# Patient Record
Sex: MALE | Race: WHITE | NOT HISPANIC OR LATINO | Employment: OTHER | URBAN - METROPOLITAN AREA
[De-identification: names, ages, dates, MRNs, and addresses within clinical notes are randomized per-mention and may not be internally consistent; named-entity substitution may affect disease eponyms.]

---

## 2024-05-08 NOTE — H&P (VIEW-ONLY)
Preoperative History and Physical Examination    Date of Exam: 5/8/2024  Proposed Surgery: Bilateral Cataract  Surgeon: Dr. Festus Reece   Date of Surgery: Right 5/16/24 Left 5/30/24  Location of Surgery:  Cataract surgery-[facility]   Fax number: [phone]    Date of Exam: 5/8/2024  Proposed Surgery: Upper Endoscopy  Surgeon: Hayden Yun  Date of Surgery: 5/28/24  Location of Surgery: Endoscopy [facility]  Fax number: [phone]    Gavin Velasco is an 89 y.o. male who presents for a preoperative clearance history and physical exam at the request of the above mentioned surgeon for the surgery indicated.     Copy of this evaluation and/or correspondence will be faxed to the above [facility]/surgery center and/or surgeon's office.     INDICATION FOR SURGERY: Patient presents for preop evaluation at the request of Dr. Reece for cataract surgery.  He also presents for preop evaluation at the request of Dr. Yun for endoscopy.    HPI: Patient has a past medical history significant for cataracts and does need surgery bilaterally.  Patient has been following with [facility].  He has had weight loss as well and has a positive FOBT and a low hemoglobin which seems to be trending down.  [facility] was consulted.  It was advised by Dr. Grimm to do a follow-up endoscopy.    No known allergies.  Never smoker.  No history of anesthesia complications.    Hypertension-blood pressure well-controlled on lisinopril 10 mg.    Chronic kidney disease stage III - Creatinine last checked 3/14/2024 with creatinine 1.16 and EGFR 59.  Labs pending.    Infrarenal abdominal aortic aneurysm-follows with cardiology.    Imaging last completed 5/8/2023:  Aneurysmal dilatation infrarenal abdominal aorta measuring 4.3 cm   increased in size from previous examination at which time it measured   3.8 cm.    Venous insufficiency/PAD-conservatively managed.  Arterial ultrasound 5/8/2023:  1.  Right lower extremity:  OSMANI at rest is normal at 1.1. Large amount   of arterial calcification. Normal multiphasic waveforms throughout   the right lower extremity arteries without evidence of a   hemodynamically significant lesion.  2.  Left lower extremity: OSMANI at rest is normal at 1.0. Large amount   of arterial calcification. Normal multiphasic waveforms throughout   the left lower extremity arteries without evidence of a   hemodynamically significant lesion.    CBC last checked 3/14/2024 with hemoglobin at 10.6.    Arthritis - follows with rheumatology. On arava.      CURRENT COMORBIDITIES: Hypertension    USE OF ANTITHROMBOTIC: None    Current Outpatient Medications:   Medication Sig     Cholecalciferol, Vitamin D3, 2,000 unit oral tablet 1 tablet (50 mcg).     ferrous sulfate (FERATAB) 325 mg (65 mg iron) oral tablet Take 1 tablet (325 mg) by mouth Every Friday and Monday.     leflunomide (ARAVA) 20 mg oral tablet Take 1 tablet (20 mg) by mouth once daily.     lisinopriL (PRINIVIL) 10 mg oral tablet Take 1 tablet (10 mg) by mouth once daily. If blood pressure > 140/90     Miscellaneous Medical Supply   1 Units by Misc.(Non-Drug; Combo Route) route once daily. Wrist brace     multivitamin oral tablet Daily.     psyllium husk (METAMUCIL) 6 gram oral PwPk Take 1 Teaspoonful by mouth once daily.       No Known Allergies    Past Medical History:   Diagnosis Date     Essential hypertension, benign        Past Surgical History:   Procedure Laterality Date     HX ABDOMINAL SURGERY         Family History   Problem Relation Name Age of Onset     Skin Melanoma Mother       Kidney Disease Father       Diabetes Brother       No Known Problems Maternal Grandmother       No Known Problems Maternal Grandfather       No Known Problems Paternal Grandmother       No Known Problems Paternal Grandfather       Stroke Sister         Social History     Tobacco Use     Smoking status: Never     Smokeless tobacco: Never   Vaping Use     Vaping status: Never  "Used   Substance Use Topics     Alcohol use: Yes     Drug use: Never       History of Anesthetic Reaction (personal or family)? no    Recent steroid use (last 6 months)? no    Immunizations up to date? yes    Immunization History   Administered Date(s) Administered     DT Pediatric 07/21/2000     H1N1 Influenza 01/14/2010     Influenza 01/13/2003, 11/24/2006, 10/16/2019, 10/16/2019     Influenza High Dose 11/25/2015, 11/07/2017, 11/20/2018, 10/11/2020, 09/28/2021     Pfizer 12+ Yrs MONOVALENT COVID Vaccine (purple cap) 03/02/2021, 03/23/2021, 09/28/2021     Pneumococcal PCV13 01/05/2016     Pneumococcal PPSV23 11/07/2017     Tdap 01/05/2016       REVIEW OF SYSTEMS:  All of the systems are negative except left ankle swelling, fatigue    REVISED CARDIAC RISK INDEX:   History of Ischemic Heart Disease: no  History of Congestive Heart Failure: no  History of Cerebrovascular Disease (stroke/TIA): no  History of Diabetes requiring preoperative insulin use: no  Chronic Kidney Disease (Cr > 2): no  Undergoing suprainguinal vascular, intraperitoneal, or intrathoracic surgery: no  Risk for cardiac death, non-fatal MI, and non-fatal cardiac arrest: 0 predictors - 0.4%    PHYSICAL EXAM:   /64   Pulse 94   Temp 97.8  F (36.6  C)   Ht 5' 5\" (1.651 m)   Wt 55.9 kg (123 lb 3.2 oz)   BMI 20.50 kg/m    LMP: No LMP for male patient.  General - Alert & oriented, pleasant and comfortable.   Head - Normocephalic, atraumatic.  Eyes - Pupils are equal, round and reactive to light bilaterally.  Extraocular movements are intact bilaterally. Sclera and conjunctiva clear. Lids without lesions  Ears - Tympanic membranes clear bilaterally. External canals without lesion.  Nose - Nares normal. Septum midline. Mucosa normal.   Mouth - Oropharynx is clear without exudates.  Neck - Normal appearing, no cervical adenopathy or carotid bruits noted.  Lungs - Clear to auscultation bilaterally, no wheezes, rales or rhonchi.  CV - Regular rate " and rhythm, no murmurs, rubs or gallops.  Abdomen - Non-tender, non-distended, positive bowel sounds, no masses, no hepatosplenomegaly. No rebound or guarding.   Extremities - No edema or deformities. Palpable pulses strong bilaterally.  Skin - warm, dry, intact. No rashes or erythema.  Neurologic - Cranial nerves 2-12 intact, patellar reflexes intact. Muscle tone, bulk and strength within normal limits throughout.  Psych - Judgment and mental status are clear, patient has reasonable insight. Mood is stable.    LABS/IMAGING:     Results for orders placed or performed in visit on 05/08/24 (from the past 24 hour(s))   CBC/DIFFERENTIAL OP   Result Value Ref Range    WBC OP 7.9 4.3 - 10.8 K/UL    RBC OP 3.77 (L) 4.60 - 6.20 M/UL    HEMOGLOBIN OP 11.0 (L) 14.0 - 18.0 gm/dL    HEMATOCRIT OP 34.2 (L) 40.0 - 54.0 %    MCV OP 91 80 - 100 fL    MCH OP 29.2 27.0 - 33.0 pg    MCHC OP 32.2 (L) 33.0 - 36.0 gm/dL    RDW OP 14.0 11.5 - 14.5 %    PLATELET COUNT  150 - 400 K/UL    MPV OP 9.3 6.5 - 12.0 fL    PMN % OP 75.2 %    LYMPHOCYTE % OP 12.8 %    MONOCYTE % OP 10.4 %    EOSINOPHIL % OP 1.0 %    BASOPHIL % OP 0.6 %    PMN ABSOLUTE OP 5.93 1.80 - 7.80 K/uL    LYMPHOCYTE ABSOLUTE OP 1.01 1.00 - 4.00 K/uL    MONOCYTE ABSOLUTE OP 0.82 0.00 - 1.00 K/uL    EOSINOPHIL ABSOLUTE OP 0.08 0.00 - 0.45 K/uL    BASOPHIL ABSOLUTE OP 0.05 0.00 - 0.20 K/uL   EKG WITH INTERPRETATION/REPORT   Result Value Ref Range    EKG         Labs not resulted here are pending and will be faxed    EKG: Interpretation: NSR, anteroseptal MI old - seen on EKG 2014 (personally reviewed)  CXR: Not indicated  Spirometry: Not indicated    ASSESSMENT/PLAN:    Gavin was seen today for pre op exam and medicare wellness.    Diagnoses and all orders for this visit:    Preoperative examination  -     CBC/DIFFERENTIAL OP  -     BASIC METABOLIC PANEL 8 ([facility])  -     EKG WITH INTERPRETATION/REPORT    Mild anemia  Proceed with endoscopy.    Cataract of both eyes,  unspecified cataract type  Proceed with cataract surgery.    Essential hypertension  Continue lisinopril without change.  Hold this for your endoscopy procedure.    Stage 3 chronic kidney disease, unspecified whether stage 3a or 3b CKD (HCC)  Labs pending.    Infrarenal abdominal aortic aneurysm (AAA) without rupture (HCC)  We will repeat imaging again this year for monitoring after determination of the endoscopy results.    Arthritis  Continue Arava without change.  Okay to take this prior to both procedures.    Encounter for Medicare annual wellness exam    Hold your supplements for 1 week prior to your surgeries.  Avoid all ibuprofen products.      RECOMMENDATIONS:   Preoperative Risk Assesment:  - Based on the inherent risks of procedure, anesthesia, and the patient's comorbid medical conditions, the patient is stratified as a medium risk candidate for the endoscopy procedure.  - Based on the inherent risks of procedure, anesthesia, and the patient's comorbid medical conditions, the patient is stratified as a low risk candidate for the cataract procedure.  - Further tests are not advised to further risk stratify Gavin Velasco prior to surgery.    Patient was advised to D/c all NSAID's, fish oil, and ASA derivatives one week prior to surgery, and that these may be resumed postoperatively unless instructed otherwise.     Patient does not require perioperative bridging of anticoagulation.     Other diagnoses as noted in the Assessment and Plan are stable at the present time unless otherwise stated.     Additional recommendations: none      Advised to bring Advanced Directive on day of surgery if one is available.     Patient had the opportunity to have all his questions answered at today's visit.     Amarilis Granados PA-C    Total time spent today for visit was 34 minutes and included: Direct face-to-face time, Review of records, and Documentation of visit.

## 2024-05-14 RX ORDER — FERROUS SULFATE 325(65) MG
325 TABLET ORAL
COMMUNITY

## 2024-05-14 RX ORDER — LEFLUNOMIDE 20 MG/1
20 TABLET ORAL DAILY
COMMUNITY

## 2024-05-14 RX ORDER — LISINOPRIL 10 MG/1
10 TABLET ORAL DAILY
COMMUNITY

## 2024-05-14 RX ORDER — MULTIVITAMIN
1 TABLET ORAL DAILY
COMMUNITY

## 2024-05-27 ENCOUNTER — ANESTHESIA EVENT (OUTPATIENT)
Dept: SURGERY | Facility: CLINIC | Age: 89
End: 2024-05-27
Payer: MEDICARE

## 2024-05-28 ENCOUNTER — HOSPITAL ENCOUNTER (OUTPATIENT)
Facility: CLINIC | Age: 89
Discharge: HOME | End: 2024-05-28
Attending: INTERNAL MEDICINE | Admitting: INTERNAL MEDICINE
Payer: MEDICARE

## 2024-05-28 ENCOUNTER — ANESTHESIA (OUTPATIENT)
Dept: SURGERY | Facility: CLINIC | Age: 89
End: 2024-05-28
Payer: MEDICARE

## 2024-05-28 ENCOUNTER — DOCUMENTATION ONLY (OUTPATIENT)
Dept: OTHER | Facility: CLINIC | Age: 89
End: 2024-05-28
Payer: COMMERCIAL

## 2024-05-28 VITALS
OXYGEN SATURATION: 96 % | RESPIRATION RATE: 16 BRPM | WEIGHT: 123 LBS | HEART RATE: 70 BPM | BODY MASS INDEX: 20.49 KG/M2 | HEIGHT: 65 IN | SYSTOLIC BLOOD PRESSURE: 140 MMHG | TEMPERATURE: 97 F | DIASTOLIC BLOOD PRESSURE: 68 MMHG

## 2024-05-28 LAB — UPPER GI ENDOSCOPY: NORMAL

## 2024-05-28 PROCEDURE — 250N000011 HC RX IP 250 OP 636: Performed by: NURSE ANESTHETIST, CERTIFIED REGISTERED

## 2024-05-28 PROCEDURE — 999N000141 HC STATISTIC PRE-PROCEDURE NURSING ASSESSMENT: Performed by: INTERNAL MEDICINE

## 2024-05-28 PROCEDURE — 360N000075 HC SURGERY LEVEL 2, PER MIN: Performed by: INTERNAL MEDICINE

## 2024-05-28 PROCEDURE — 258N000003 HC RX IP 258 OP 636: Performed by: ANESTHESIOLOGY

## 2024-05-28 PROCEDURE — 370N000017 HC ANESTHESIA TECHNICAL FEE, PER MIN: Performed by: INTERNAL MEDICINE

## 2024-05-28 PROCEDURE — 250N000009 HC RX 250: Performed by: NURSE ANESTHETIST, CERTIFIED REGISTERED

## 2024-05-28 PROCEDURE — 710N000012 HC RECOVERY PHASE 2, PER MINUTE: Performed by: INTERNAL MEDICINE

## 2024-05-28 PROCEDURE — 88342 IMHCHEM/IMCYTCHM 1ST ANTB: CPT | Mod: TC | Performed by: INTERNAL MEDICINE

## 2024-05-28 PROCEDURE — 272N000001 HC OR GENERAL SUPPLY STERILE: Performed by: INTERNAL MEDICINE

## 2024-05-28 RX ORDER — NALOXONE HYDROCHLORIDE 0.4 MG/ML
0.1 INJECTION, SOLUTION INTRAMUSCULAR; INTRAVENOUS; SUBCUTANEOUS
Status: DISCONTINUED | OUTPATIENT
Start: 2024-05-28 | End: 2024-05-28 | Stop reason: HOSPADM

## 2024-05-28 RX ORDER — SODIUM CHLORIDE, SODIUM LACTATE, POTASSIUM CHLORIDE, CALCIUM CHLORIDE 600; 310; 30; 20 MG/100ML; MG/100ML; MG/100ML; MG/100ML
INJECTION, SOLUTION INTRAVENOUS CONTINUOUS
Status: DISCONTINUED | OUTPATIENT
Start: 2024-05-28 | End: 2024-05-28 | Stop reason: HOSPADM

## 2024-05-28 RX ORDER — ONDANSETRON 2 MG/ML
4 INJECTION INTRAMUSCULAR; INTRAVENOUS EVERY 6 HOURS PRN
Status: CANCELLED | OUTPATIENT
Start: 2024-05-28

## 2024-05-28 RX ORDER — OXYCODONE HYDROCHLORIDE 5 MG/1
5 TABLET ORAL EVERY 4 HOURS PRN
Status: DISCONTINUED | OUTPATIENT
Start: 2024-05-28 | End: 2024-05-28 | Stop reason: HOSPADM

## 2024-05-28 RX ORDER — DEXAMETHASONE SODIUM PHOSPHATE 10 MG/ML
4 INJECTION, SOLUTION INTRAMUSCULAR; INTRAVENOUS
Status: DISCONTINUED | OUTPATIENT
Start: 2024-05-28 | End: 2024-05-28 | Stop reason: HOSPADM

## 2024-05-28 RX ORDER — OXYCODONE HYDROCHLORIDE 5 MG/1
10 TABLET ORAL EVERY 4 HOURS PRN
Status: DISCONTINUED | OUTPATIENT
Start: 2024-05-28 | End: 2024-05-28 | Stop reason: HOSPADM

## 2024-05-28 RX ORDER — HYDROMORPHONE HCL IN WATER/PF 6 MG/30 ML
0.2 PATIENT CONTROLLED ANALGESIA SYRINGE INTRAVENOUS EVERY 5 MIN PRN
Status: DISCONTINUED | OUTPATIENT
Start: 2024-05-28 | End: 2024-05-28 | Stop reason: HOSPADM

## 2024-05-28 RX ORDER — LIDOCAINE HYDROCHLORIDE 10 MG/ML
INJECTION, SOLUTION INFILTRATION; PERINEURAL PRN
Status: DISCONTINUED | OUTPATIENT
Start: 2024-05-28 | End: 2024-05-28

## 2024-05-28 RX ORDER — ONDANSETRON 4 MG/1
4 TABLET, ORALLY DISINTEGRATING ORAL EVERY 30 MIN PRN
Status: DISCONTINUED | OUTPATIENT
Start: 2024-05-28 | End: 2024-05-28 | Stop reason: HOSPADM

## 2024-05-28 RX ORDER — ONDANSETRON 4 MG/1
4 TABLET, ORALLY DISINTEGRATING ORAL EVERY 6 HOURS PRN
Status: CANCELLED | OUTPATIENT
Start: 2024-05-28

## 2024-05-28 RX ORDER — PROPOFOL 10 MG/ML
INJECTION, EMULSION INTRAVENOUS PRN
Status: DISCONTINUED | OUTPATIENT
Start: 2024-05-28 | End: 2024-05-28

## 2024-05-28 RX ORDER — ONDANSETRON 2 MG/ML
4 INJECTION INTRAMUSCULAR; INTRAVENOUS
Status: DISCONTINUED | OUTPATIENT
Start: 2024-05-28 | End: 2024-05-28 | Stop reason: HOSPADM

## 2024-05-28 RX ORDER — PROCHLORPERAZINE MALEATE 5 MG
5 TABLET ORAL EVERY 6 HOURS PRN
Status: CANCELLED | OUTPATIENT
Start: 2024-05-28

## 2024-05-28 RX ORDER — FENTANYL CITRATE 50 UG/ML
25 INJECTION, SOLUTION INTRAMUSCULAR; INTRAVENOUS EVERY 5 MIN PRN
Status: DISCONTINUED | OUTPATIENT
Start: 2024-05-28 | End: 2024-05-28 | Stop reason: HOSPADM

## 2024-05-28 RX ORDER — FENTANYL CITRATE 50 UG/ML
25 INJECTION, SOLUTION INTRAMUSCULAR; INTRAVENOUS
Status: DISCONTINUED | OUTPATIENT
Start: 2024-05-28 | End: 2024-05-28 | Stop reason: HOSPADM

## 2024-05-28 RX ORDER — HYDROMORPHONE HCL IN WATER/PF 6 MG/30 ML
0.4 PATIENT CONTROLLED ANALGESIA SYRINGE INTRAVENOUS EVERY 5 MIN PRN
Status: DISCONTINUED | OUTPATIENT
Start: 2024-05-28 | End: 2024-05-28 | Stop reason: HOSPADM

## 2024-05-28 RX ORDER — ONDANSETRON 2 MG/ML
4 INJECTION INTRAMUSCULAR; INTRAVENOUS EVERY 30 MIN PRN
Status: DISCONTINUED | OUTPATIENT
Start: 2024-05-28 | End: 2024-05-28 | Stop reason: HOSPADM

## 2024-05-28 RX ORDER — LIDOCAINE 40 MG/G
CREAM TOPICAL
Status: DISCONTINUED | OUTPATIENT
Start: 2024-05-28 | End: 2024-05-28 | Stop reason: HOSPADM

## 2024-05-28 RX ORDER — PROPOFOL 10 MG/ML
INJECTION, EMULSION INTRAVENOUS CONTINUOUS PRN
Status: DISCONTINUED | OUTPATIENT
Start: 2024-05-28 | End: 2024-05-28

## 2024-05-28 RX ORDER — FENTANYL CITRATE 50 UG/ML
50 INJECTION, SOLUTION INTRAMUSCULAR; INTRAVENOUS EVERY 5 MIN PRN
Status: DISCONTINUED | OUTPATIENT
Start: 2024-05-28 | End: 2024-05-28 | Stop reason: HOSPADM

## 2024-05-28 RX ADMIN — SODIUM CHLORIDE, POTASSIUM CHLORIDE, SODIUM LACTATE AND CALCIUM CHLORIDE: 600; 310; 30; 20 INJECTION, SOLUTION INTRAVENOUS at 06:47

## 2024-05-28 RX ADMIN — LIDOCAINE HYDROCHLORIDE 10 ML: 10 INJECTION, SOLUTION INFILTRATION; PERINEURAL at 07:36

## 2024-05-28 RX ADMIN — PROPOFOL 40 MG: 10 INJECTION, EMULSION INTRAVENOUS at 07:36

## 2024-05-28 RX ADMIN — PROPOFOL 50 MCG/KG/MIN: 10 INJECTION, EMULSION INTRAVENOUS at 07:35

## 2024-05-28 ASSESSMENT — ACTIVITIES OF DAILY LIVING (ADL)
ADLS_ACUITY_SCORE: 35
ADLS_ACUITY_SCORE: 35

## 2024-05-28 NOTE — ANESTHESIA POSTPROCEDURE EVALUATION
Patient: Gavin Velasco    Procedure: Procedure(s):  ESOPHAGOGASTRODUODENOSCOPY WITH BIOPSIES       Anesthesia Type:  MAC    Note:  Disposition: Outpatient   Postop Pain Control: Uneventful            Sign Out: Well controlled pain   PONV: No   Neuro/Psych: Uneventful            Sign Out: Acceptable/Baseline neuro status   Airway/Respiratory: Uneventful            Sign Out: Acceptable/Baseline resp. status   CV/Hemodynamics: Uneventful            Sign Out: Acceptable CV status; No obvious hypovolemia; No obvious fluid overload   Other NRE: NONE   DID A NON-ROUTINE EVENT OCCUR? No           Last vitals:  Vitals Value Taken Time   /68 05/28/24 0817   Temp 36.1  C (97  F) 05/28/24 0817   Pulse 68 05/28/24 0818   Resp 16 05/28/24 0817   SpO2 97 % 05/28/24 0818   Vitals shown include unfiled device data.    Electronically Signed By: ABBY PIMENTEL MD  May 28, 2024  2:15 PM

## 2024-05-28 NOTE — ANESTHESIA CARE TRANSFER NOTE
Patient: Gavin Velasco    Procedure: Procedure(s):  ESOPHAGOGASTRODUODENOSCOPY WITH BIOPSIES       Diagnosis: Anemia [D64.9]  Diagnosis Additional Information: No value filed.    Anesthesia Type:   MAC     Note:    Oropharynx: oropharynx clear of all foreign objects  Level of Consciousness: drowsy  Oxygen Supplementation: face mask  Level of Supplemental Oxygen (L/min / FiO2): 6  Independent Airway: airway patency satisfactory and stable  Dentition: dentition unchanged  Vital Signs Stable: post-procedure vital signs reviewed and stable  Report to RN Given: handoff report given  Patient transferred to: Phase II    Handoff Report: Identifed the Patient, Identified the Reponsible Provider, Reviewed the pertinent medical history, Discussed the surgical course, Reviewed Intra-OP anesthesia mangement and issues during anesthesia, Set expectations for post-procedure period and Allowed opportunity for questions and acknowledgement of understanding    Vitals:  Vitals Value Taken Time   /56 05/28/24 0755   Temp 35.9  C (96.7  F) 05/28/24 0755   Pulse 61 05/28/24 0755   Resp 14 05/28/24 0755   SpO2 99 % 05/28/24 0755       Electronically Signed By: AMAN Clay CRNA  May 28, 2024  7:56 AM

## 2024-05-28 NOTE — ANESTHESIA PREPROCEDURE EVALUATION
"Anesthesia Pre-Procedure Evaluation    Patient: Gavin Velasco   MRN: [id number] : 9/3/1932        Procedure : Procedure(s):  ESOPHAGOGASTRODUODENOSCOPY          Past Medical History:   Diagnosis Date    Hypertension       History reviewed. No pertinent surgical history.   No Known Allergies   Social History     Tobacco Use    Smoking status: Never    Smokeless tobacco: Never   Substance Use Topics    Alcohol use: Not on file      Wt Readings from Last 1 Encounters:   24 55.8 kg (123 lb)        Anesthesia Evaluation   Pt has had prior anesthetic.     No history of anesthetic complications       ROS/MED HX  ENT/Pulmonary:  - neg pulmonary ROS     Neurologic:  - neg neurologic ROS     Cardiovascular: Comment: AAA    (+)  hypertension- Peripheral Vascular Disease-   -  - -                                      METS/Exercise Tolerance:     Hematologic:     (+)      anemia,          Musculoskeletal:   (+)  arthritis,             GI/Hepatic:  - neg GI/hepatic ROS     Renal/Genitourinary:     (+) renal disease, type: CRI,            Endo:  - neg endo ROS     Psychiatric/Substance Use:       Infectious Disease:       Malignancy:       Other:            Physical Exam    Airway  airway exam normal           Respiratory Devices and Support         Dental       (+) Multiple crowns, permanant bridges      Cardiovascular   cardiovascular exam normal          Pulmonary   pulmonary exam normal                OUTSIDE LABS:  CBC: No results found for: \"WBC\", \"HGB\", \"HCT\", \"PLT\"  BMP: No results found for: \"NA\", \"POTASSIUM\", \"CHLORIDE\", \"CO2\", \"BUN\", \"CR\", \"GLC\"  COAGS: No results found for: \"PTT\", \"INR\", \"FIBR\"  POC: No results found for: \"BGM\", \"HCG\", \"HCGS\"  HEPATIC: No results found for: \"ALBUMIN\", \"PROTTOTAL\", \"ALT\", \"AST\", \"GGT\", \"ALKPHOS\", \"BILITOTAL\", \"BILIDIRECT\", \"DIANA\"  OTHER: No results found for: \"PH\", \"LACT\", \"A1C\", \"LORELEI\", \"PHOS\", \"MAG\", \"LIPASE\", \"AMYLASE\", \"TSH\", \"T4\", \"T3\", \"CRP\", \"SED\"    Anesthesia " Plan    ASA Status:  3    NPO Status:  NPO Appropriate    Anesthesia Type: MAC.              Consents    Anesthesia Plan(s) and associated risks, benefits, and realistic alternatives discussed. Questions answered and patient/representative(s) expressed understanding.     - Discussed:     - Discussed with:  Patient            Postoperative Care    Pain management: Multi-modal analgesia.        Comments:               ABBY PIMENTEL MD    I have reviewed the pertinent notes and labs in the chart from the past 30 days and (re)examined the patient.  Any updates or changes from those notes are reflected in this note.

## 2024-05-28 NOTE — INTERVAL H&P NOTE
I have reviewed the surgical (or preoperative) H&P that is linked to this encounter, and examined the patient. There are no significant changes.    Hayden Graves MD

## 2024-05-29 LAB
PATH REPORT.COMMENTS IMP SPEC: NORMAL
PATH REPORT.COMMENTS IMP SPEC: NORMAL
PATH REPORT.FINAL DX SPEC: NORMAL
PATH REPORT.GROSS SPEC: NORMAL
PATH REPORT.MICROSCOPIC SPEC OTHER STN: NORMAL
PATH REPORT.RELEVANT HX SPEC: NORMAL
PHOTO IMAGE: NORMAL

## 2024-05-29 PROCEDURE — 88305 TISSUE EXAM BY PATHOLOGIST: CPT | Mod: 26 | Performed by: PATHOLOGY

## 2024-05-29 PROCEDURE — 88342 IMHCHEM/IMCYTCHM 1ST ANTB: CPT | Mod: 26 | Performed by: PATHOLOGY

## (undated) DEVICE — SUCTION MANIFOLD NEPTUNE 2 SYS 1 PORT 702-025-000

## (undated) DEVICE — FORCEP BIOPSY 2.3MM DISP COATED 000388

## (undated) DEVICE — SOL WATER IRRIG 1000ML BOTTLE 2F7114

## (undated) DEVICE — TUBING SUCTION MEDI-VAC 1/4"X20' N620A